# Patient Record
Sex: FEMALE | Race: OTHER | NOT HISPANIC OR LATINO | URBAN - METROPOLITAN AREA
[De-identification: names, ages, dates, MRNs, and addresses within clinical notes are randomized per-mention and may not be internally consistent; named-entity substitution may affect disease eponyms.]

---

## 2020-03-13 ENCOUNTER — EMERGENCY (EMERGENCY)
Facility: HOSPITAL | Age: 35
LOS: 0 days | Discharge: HOME | End: 2020-03-13
Attending: EMERGENCY MEDICINE
Payer: COMMERCIAL

## 2020-03-13 VITALS
SYSTOLIC BLOOD PRESSURE: 131 MMHG | HEART RATE: 89 BPM | RESPIRATION RATE: 18 BRPM | OXYGEN SATURATION: 100 % | TEMPERATURE: 99 F | DIASTOLIC BLOOD PRESSURE: 86 MMHG

## 2020-03-13 VITALS — WEIGHT: 160.06 LBS | HEIGHT: 62 IN

## 2020-03-13 DIAGNOSIS — R53.1 WEAKNESS: ICD-10-CM

## 2020-03-13 DIAGNOSIS — R11.0 NAUSEA: ICD-10-CM

## 2020-03-13 DIAGNOSIS — R63.0 ANOREXIA: ICD-10-CM

## 2020-03-13 DIAGNOSIS — M79.18 MYALGIA, OTHER SITE: ICD-10-CM

## 2020-03-13 DIAGNOSIS — R61 GENERALIZED HYPERHIDROSIS: ICD-10-CM

## 2020-03-13 DIAGNOSIS — Z85.42 PERSONAL HISTORY OF MALIGNANT NEOPLASM OF OTHER PARTS OF UTERUS: ICD-10-CM

## 2020-03-13 LAB
ALBUMIN SERPL ELPH-MCNC: 3.8 G/DL — SIGNIFICANT CHANGE UP (ref 3.3–5)
ALP SERPL-CCNC: 126 U/L — HIGH (ref 40–120)
ALT FLD-CCNC: 17 U/L — SIGNIFICANT CHANGE UP (ref 12–78)
ANION GAP SERPL CALC-SCNC: 4 MMOL/L — LOW (ref 5–17)
APPEARANCE UR: CLEAR — SIGNIFICANT CHANGE UP
AST SERPL-CCNC: 18 U/L — SIGNIFICANT CHANGE UP (ref 15–37)
BASOPHILS # BLD AUTO: 0.06 K/UL — SIGNIFICANT CHANGE UP (ref 0–0.2)
BASOPHILS NFR BLD AUTO: 0.6 % — SIGNIFICANT CHANGE UP (ref 0–2)
BILIRUB SERPL-MCNC: 0.5 MG/DL — SIGNIFICANT CHANGE UP (ref 0.2–1.2)
BILIRUB UR-MCNC: NEGATIVE — SIGNIFICANT CHANGE UP
BUN SERPL-MCNC: 13 MG/DL — SIGNIFICANT CHANGE UP (ref 7–23)
CALCIUM SERPL-MCNC: 9.4 MG/DL — SIGNIFICANT CHANGE UP (ref 8.5–10.1)
CHLORIDE SERPL-SCNC: 108 MMOL/L — SIGNIFICANT CHANGE UP (ref 96–108)
CO2 SERPL-SCNC: 27 MMOL/L — SIGNIFICANT CHANGE UP (ref 22–31)
COLOR SPEC: YELLOW — SIGNIFICANT CHANGE UP
CREAT SERPL-MCNC: 0.7 MG/DL — SIGNIFICANT CHANGE UP (ref 0.5–1.3)
DIFF PNL FLD: ABNORMAL
EOSINOPHIL # BLD AUTO: 0.13 K/UL — SIGNIFICANT CHANGE UP (ref 0–0.5)
EOSINOPHIL NFR BLD AUTO: 1.3 % — SIGNIFICANT CHANGE UP (ref 0–6)
GLUCOSE SERPL-MCNC: 71 MG/DL — SIGNIFICANT CHANGE UP (ref 70–99)
GLUCOSE UR QL: NEGATIVE MG/DL — SIGNIFICANT CHANGE UP
HCT VFR BLD CALC: 42.3 % — SIGNIFICANT CHANGE UP (ref 34.5–45)
HGB BLD-MCNC: 14.1 G/DL — SIGNIFICANT CHANGE UP (ref 11.5–15.5)
IMM GRANULOCYTES NFR BLD AUTO: 0.3 % — SIGNIFICANT CHANGE UP (ref 0–1.5)
KETONES UR-MCNC: NEGATIVE — SIGNIFICANT CHANGE UP
LEUKOCYTE ESTERASE UR-ACNC: ABNORMAL
LYMPHOCYTES # BLD AUTO: 1.95 K/UL — SIGNIFICANT CHANGE UP (ref 1–3.3)
LYMPHOCYTES # BLD AUTO: 19.7 % — SIGNIFICANT CHANGE UP (ref 13–44)
MCHC RBC-ENTMCNC: 30.7 PG — SIGNIFICANT CHANGE UP (ref 27–34)
MCHC RBC-ENTMCNC: 33.3 GM/DL — SIGNIFICANT CHANGE UP (ref 32–36)
MCV RBC AUTO: 92 FL — SIGNIFICANT CHANGE UP (ref 80–100)
MONOCYTES # BLD AUTO: 0.9 K/UL — SIGNIFICANT CHANGE UP (ref 0–0.9)
MONOCYTES NFR BLD AUTO: 9.1 % — SIGNIFICANT CHANGE UP (ref 2–14)
NEUTROPHILS # BLD AUTO: 6.83 K/UL — SIGNIFICANT CHANGE UP (ref 1.8–7.4)
NEUTROPHILS NFR BLD AUTO: 69 % — SIGNIFICANT CHANGE UP (ref 43–77)
NITRITE UR-MCNC: NEGATIVE — SIGNIFICANT CHANGE UP
PH UR: 5 — SIGNIFICANT CHANGE UP (ref 5–8)
PLATELET # BLD AUTO: 329 K/UL — SIGNIFICANT CHANGE UP (ref 150–400)
POTASSIUM SERPL-MCNC: 3.8 MMOL/L — SIGNIFICANT CHANGE UP (ref 3.5–5.3)
POTASSIUM SERPL-SCNC: 3.8 MMOL/L — SIGNIFICANT CHANGE UP (ref 3.5–5.3)
PROT SERPL-MCNC: 8.1 GM/DL — SIGNIFICANT CHANGE UP (ref 6–8.3)
PROT UR-MCNC: NEGATIVE MG/DL — SIGNIFICANT CHANGE UP
RBC # BLD: 4.6 M/UL — SIGNIFICANT CHANGE UP (ref 3.8–5.2)
RBC # FLD: 13.3 % — SIGNIFICANT CHANGE UP (ref 10.3–14.5)
SODIUM SERPL-SCNC: 139 MMOL/L — SIGNIFICANT CHANGE UP (ref 135–145)
SP GR SPEC: 1.03 — HIGH (ref 1.01–1.02)
UROBILINOGEN FLD QL: NEGATIVE MG/DL — SIGNIFICANT CHANGE UP
WBC # BLD: 9.9 K/UL — SIGNIFICANT CHANGE UP (ref 3.8–10.5)
WBC # FLD AUTO: 9.9 K/UL — SIGNIFICANT CHANGE UP (ref 3.8–10.5)

## 2020-03-13 PROCEDURE — 93010 ELECTROCARDIOGRAM REPORT: CPT

## 2020-03-13 PROCEDURE — 96374 THER/PROPH/DIAG INJ IV PUSH: CPT

## 2020-03-13 PROCEDURE — 85025 COMPLETE CBC W/AUTO DIFF WBC: CPT

## 2020-03-13 PROCEDURE — 80053 COMPREHEN METABOLIC PANEL: CPT

## 2020-03-13 PROCEDURE — 36415 COLL VENOUS BLD VENIPUNCTURE: CPT

## 2020-03-13 PROCEDURE — 93005 ELECTROCARDIOGRAM TRACING: CPT

## 2020-03-13 PROCEDURE — 99284 EMERGENCY DEPT VISIT MOD MDM: CPT | Mod: 25

## 2020-03-13 PROCEDURE — 99284 EMERGENCY DEPT VISIT MOD MDM: CPT

## 2020-03-13 PROCEDURE — 81025 URINE PREGNANCY TEST: CPT

## 2020-03-13 PROCEDURE — 81001 URINALYSIS AUTO W/SCOPE: CPT

## 2020-03-13 RX ORDER — KETOROLAC TROMETHAMINE 30 MG/ML
15 SYRINGE (ML) INJECTION ONCE
Refills: 0 | Status: DISCONTINUED | OUTPATIENT
Start: 2020-03-13 | End: 2020-03-13

## 2020-03-13 RX ORDER — SODIUM CHLORIDE 9 MG/ML
1000 INJECTION INTRAMUSCULAR; INTRAVENOUS; SUBCUTANEOUS ONCE
Refills: 0 | Status: COMPLETED | OUTPATIENT
Start: 2020-03-13 | End: 2020-03-13

## 2020-03-13 RX ORDER — ONDANSETRON 8 MG/1
1 TABLET, FILM COATED ORAL
Qty: 12 | Refills: 0
Start: 2020-03-13 | End: 2020-03-16

## 2020-03-13 RX ADMIN — SODIUM CHLORIDE 1000 MILLILITER(S): 9 INJECTION INTRAMUSCULAR; INTRAVENOUS; SUBCUTANEOUS at 17:30

## 2020-03-13 RX ADMIN — Medication 15 MILLIGRAM(S): at 17:30

## 2020-03-13 NOTE — ED STATDOCS - NSFOLLOWUPCLINICS_GEN_ALL_ED_FT
[facility]  Family Medicine  [address] [place] [address]  Phone: [phone]  Fax:   Follow Up Time:

## 2020-03-13 NOTE — ED STATDOCS - CLINICAL SUMMARY MEDICAL DECISION MAKING FREE TEXT BOX
Additional Complaints Plan: Labs, fluids, EKG, Toradol, reassess. Plan: Labs, fluids, EKG, Toradol, reassess.    Labs unremarkable.  PT. reports decreased appetite and PO intake due to nausea.  Will DC with Anson Ruiz follow up.  Thelma Moore PA-C

## 2020-03-13 NOTE — ED STATDOCS - OBJECTIVE STATEMENT
35 y/o F with no pertinent PMHx presenting to the ED c/o body aches, weakness x3 days. +chills, diaphoresis Patient reports that she woke up this morning and fell to the ground due to weakness. +syncope No head strike. [place], but no know sick contacts. States that she took Aleve this morning with mild relief. Denies sick contacts, recent travel outside of [place], cough, N/V/D, sore throat, fever.

## 2020-03-13 NOTE — ED STATDOCS - NS ED SCRIBE STATEMENT
Attending
I personally performed the service described in the documentation recorded by the scribe in my presence, and it accurately and completely records my words and actions.

## 2020-03-13 NOTE — ED ADULT TRIAGE NOTE - CHIEF COMPLAINT QUOTE
Pt states she has body aches for a couple of days, arrived wearing mask, and denies exposure to anyone diagnosed with COVID-19, denies fever

## 2020-03-13 NOTE — ED STATDOCS - CHPI ED SYMPTOM POS
+body aches +chills +diaphoresis +generalized weakness +syncope +body aches +chills +diaphoresis +generalized weakness

## 2020-03-13 NOTE — ED STATDOCS - PATIENT PORTAL LINK FT
You can access the FollowMyHealth Patient Portal offered by [facility] by registering at the following website: [email or web address]. By joining [employer]’s FollowMyHealth portal, you will also be able to view your health information using other applications (apps) compatible with our system.

## 2020-03-13 NOTE — ED STATDOCS - NSFOLLOWUPINSTRUCTIONS_ED_ALL_ED_FT
Weakness  Weakness is a lack of strength. You may feel weak all over your body (generalized), or you may feel weak in one specific part of your body (focal). Common causes of weakness include:  Infection and immune system disorders.Physical exhaustion.Internal bleeding or other blood loss that results in a lack of red blood cells (anemia).Dehydration.An imbalance in mineral (electrolyte) levels, such as potassium.Heart disease, circulation problems, or stroke.Other causes include:  Some medicines or cancer treatment.Stress, anxiety, or depression.Nervous system disorders.Thyroid disorders.Loss of muscle strength because of age or inactivity.Poor sleep quality or sleep disorders.The cause of your weakness may not be known. Some causes of weakness can be serious, so it is important to see your health care provider.  Follow these instructions at home:  Activity  Rest as needed.Try to get enough sleep. Most adults need 7–8 hours of quality sleep each night. Talk to your health care provider about how much sleep you need each night.Do exercises, such as arm curls and leg raises, for 30 minutes at least 2 days a week or as told by your health care provider. This helps build muscle strength.Consider working with a physical therapist or  who can develop an exercise plan to help you gain muscle strength.General instructions   Take over-the-counter and prescription medicines only as told by your health care provider.Eat a healthy, well-balanced diet. This includes:  Proteins to build muscles, such as lean meats and fish.Fresh fruits and vegetables.Carbohydrates to boost energy, such as whole grains.Drink enough fluid to keep your urine pale yellow.Keep all follow-up visits as told by your health care provider. This is important.Contact a health care provider if your weakness:  Does not improve or gets worse.Affects your ability to think clearly.Affects your ability to do your normal daily activities.Get help right away if you:  Develop sudden weakness, especially on one side of your face or body.Have chest pain.Have trouble breathing or shortness of breath.Have problems with your vision.Have trouble talking or swallowing.Have trouble standing or walking.Are light-headed or lose consciousness  .Summary Weakness is a lack of strength. You may feel weak all over your body or just in one specific part of your body.Weakness can be caused by a variety of things. In some cases, the cause may be unknown.Rest as needed, and try to get enough sleep. Most adults need 7–8 hours of quality sleep each night.Eat a healthy, well-balanced diet.This information is not intended to replace advice given to you by your health care provider. Make sure you discuss any questions you have with your health care provider.

## 2020-03-13 NOTE — ED STATDOCS - PROGRESS NOTE DETAILS
33 y/o F with no pertinent PMHx presenting to the ED c/o body aches, weakness x3 days. +chills, diaphoresis Patient reports that she woke up this morning and fell to the ground due to weakness. +syncope No head strike. [place] the train [place], but no know sick contacts. States that she took Aleve this morning with mild relief. Denies sick contacts, recent travel outside of [place], cough, N/V/D, sore throat, fever.  Thelma Moore PA-C Labs unremarkable. Labs unremarkable.  PT. reports decreased appetite and PO intake due to nausea.  Will DC with Anson Ruiz follow up.  Thelma Moore PA-C

## 2020-03-13 NOTE — ED STATDOCS - ATTENDING CONTRIBUTION TO CARE
I, Meredith Escalera MD,  performed the initial face to face bedside interview with this patient regarding history of present illness, review of symptoms and relevant past medical, social and family history.  I completed an independent physical examination.  I was the initial provider who evaluated this patient. I have signed out the follow up of any pending tests (i.e. labs, radiological studies) to the ACP.  I have communicated the patient’s plan of care and disposition with the ACP.  The history, relevant review of systems, past medical and surgical history, medical decision making, and physical examination was documented by the scribe in my presence and I attest to the accuracy of the documentation.

## 2024-02-09 NOTE — ED ADULT TRIAGE NOTE - PRO INTERPRETER NEED 2
Fax received from N form from [employer] requesting Dex[employer] G7 supplies    Form completed, and placed on 's desk for review and signature.    Forms signed and faxed to [phone]  
[phone]
English

## 2024-12-11 NOTE — ED STATDOCS - NS ED ATTENDING STATEMENT MOD
Caller: CLYDE SPEARS    Relationship: Emergency Contact    Best call back number: [phone]    What form or medical record are you requesting: SUPPORT CANINE PAPERWORK    Who is requesting this form or medical record from you: PATIENT'S [employer]    How would you like to receive the form or medical records (pick-up, mail, fax): FAX# ON PAPERWORK    Timeframe paperwork needed: ASAP, SO THE PATIENT CAN MOVE INTO NEW APARTMENT.    Additional notes: CLYDE CALLING STATING THAT THE PATIENT'S [employer] IS FAXING PAPERWORK TO DR. ENGLE, THAT WILL NEED TO BE COMPLETED REGARDING TWO SUPPORT CANINES THE PATIENT HAS FOR HIS ANXIETY AND DEPRESSION. CLYDE STATES THAT THE PAPERWORK MUST LIST BOTH CANINES AND BOTH SUPPORT REASONS.  CANINE #1: [place]  CANINE #2: [place]  SUPPORT REASONS: ANXIETY AND DEPRESSION    CLYDE REQUEST A CALL BACK IF DR. ENGLE HAS NOT RECEIVED THE PAPERWORK TO COMPLETE FROM THE [employer].        
I will keep an eye for the paperwork.   
Spoke to Sharri, let her know we still have not received the paperwork she called about. She will talk with apartment management and have them refax or she will bring it in on the 20th at patient appointment.   
I have personally performed a face to face diagnostic evaluation on this patient. I have reviewed the ACP note and agree with the history, exam and plan of care, except as noted.